# Patient Record
Sex: FEMALE | Race: WHITE | NOT HISPANIC OR LATINO | Employment: FULL TIME | URBAN - METROPOLITAN AREA
[De-identification: names, ages, dates, MRNs, and addresses within clinical notes are randomized per-mention and may not be internally consistent; named-entity substitution may affect disease eponyms.]

---

## 2018-04-14 ENCOUNTER — HOSPITAL ENCOUNTER (EMERGENCY)
Facility: HOSPITAL | Age: 53
Discharge: HOME | End: 2018-04-14
Attending: EMERGENCY MEDICINE | Admitting: EMERGENCY MEDICINE
Payer: COMMERCIAL

## 2018-04-14 VITALS
RESPIRATION RATE: 20 BRPM | BODY MASS INDEX: 22.76 KG/M2 | SYSTOLIC BLOOD PRESSURE: 202 MMHG | WEIGHT: 145 LBS | TEMPERATURE: 98.2 F | HEART RATE: 70 BPM | HEIGHT: 67 IN | DIASTOLIC BLOOD PRESSURE: 97 MMHG | OXYGEN SATURATION: 97 %

## 2018-04-14 DIAGNOSIS — H00.13 CHALAZION OF RIGHT EYE: Primary | ICD-10-CM

## 2018-04-14 PROCEDURE — 99283 EMERGENCY DEPT VISIT LOW MDM: CPT

## 2018-04-14 RX ORDER — SULFAMETHOXAZOLE AND TRIMETHOPRIM 800; 160 MG/1; MG/1
1 TABLET ORAL 2 TIMES DAILY
Qty: 20 TABLET | Refills: 0 | Status: SHIPPED | OUTPATIENT
Start: 2018-04-14 | End: 2018-04-24

## 2018-04-14 RX ORDER — SULFAMETHOXAZOLE AND TRIMETHOPRIM 800; 160 MG/1; MG/1
1 TABLET ORAL ONCE
Status: COMPLETED | OUTPATIENT
Start: 2018-04-14 | End: 2018-04-14

## 2018-04-14 RX ADMIN — SULFAMETHOXAZOLE AND TRIMETHOPRIM 1 TABLET: 800; 160 TABLET ORAL at 18:17

## 2018-04-14 NOTE — ED PROVIDER NOTES
History  Chief Complaint   Patient presents with    Blepharitis     Patient presents to Er stating she started with a stye in her right upper eye lid on thurday, now whole eyelid swollen and red, sent from [facility]  patient states she recently changed her mascara  Patient present to the ED with right upper eyelid pain and swelling for 2 days  She states she gets a recurrence of this every month before her period  Patient denies fevers/chills  She denies any visual disturbance  Patient has been applying warm compresses  She states this morning her right eye was swollen shut, but improved as the day went on  History provided by:  Patient  Eye Problem   Location:  Right eye  Quality:  Aching  Severity:  Mild  Onset quality:  Gradual  Duration:  2 days  Timing:  Constant  Progression:  Worsening  Chronicity:  Recurrent  Context: not contact lens problem, not direct trauma, not foreign body, not scratch and not UV exposure    Relieved by:  Nothing  Worsened by:  Nothing  Ineffective treatments: warm compresses  Associated symptoms: inflammation, redness and swelling    Associated symptoms: no blurred vision, no crusting, no decreased vision, no discharge, no facial rash, no itching, no nausea, no numbness, no photophobia, no tearing, no vomiting and no weakness        None       History reviewed  No pertinent past medical history  History reviewed  No pertinent surgical history  History reviewed  No pertinent family history  I have reviewed and agree with the history as documented  Social History   Substance Use Topics    Smoking status: Never Smoker    Smokeless tobacco: Never Used    Alcohol use Yes      Comment: socially        Review of Systems   Constitutional: Negative for chills and fever  HENT: Negative  Eyes: Positive for pain and redness  Negative for blurred vision, photophobia, discharge, itching and visual disturbance     Gastrointestinal: Negative for nausea and vomiting  Skin: Positive for color change  Negative for rash and wound  Neurological: Negative for dizziness, weakness and numbness  Psychiatric/Behavioral: Negative for confusion  All other systems reviewed and are negative  Physical Exam  ED Triage Vitals [04/14/18 1808]   Temperature Pulse Respirations Blood Pressure SpO2   98 2 °F (36 8 °C) 70 18 (!) 219/106 97 %      Temp Source Heart Rate Source Patient Position - Orthostatic VS BP Location FiO2 (%)   Temporal Monitor Sitting Right arm --      Pain Score       5           Orthostatic Vital Signs  Vitals:    04/14/18 1808 04/14/18 1820   BP: (!) 219/106 (!) 202/97   Pulse: 70    Patient Position - Orthostatic VS: Sitting Sitting       Physical Exam   Constitutional: She is oriented to person, place, and time  She appears well-developed and well-nourished  She is active and cooperative  She does not appear ill  No distress  HENT:   Head: Normocephalic and atraumatic  Right Ear: Hearing normal    Left Ear: Hearing normal    Nose: Nose normal    Eyes: Conjunctivae are normal  Pupils are equal, round, and reactive to light  Neck: Normal range of motion  Cardiovascular: Normal rate, regular rhythm and normal heart sounds  No murmur heard  Pulmonary/Chest: Effort normal and breath sounds normal  She has no wheezes  She has no rhonchi  She has no rales  Neurological: She is alert and oriented to person, place, and time  She has normal strength  No sensory deficit  Skin: Skin is warm and dry  There is erythema (to right upper eyelid  mild swelling under right eye, but no warmth or erythema  )  Nursing note and vitals reviewed        ED Medications  Medications   sulfamethoxazole-trimethoprim (BACTRIM DS) 800-160 mg per tablet 1 tablet (1 tablet Oral Given 4/14/18 1817)       Diagnostic Studies  Results Reviewed     None                 No orders to display              Procedures  Procedures       Phone Contacts  ED Phone Contact    ED Course  ED Course                                MDM  Number of Diagnoses or Management Options  Chalazion of right eye: established and worsening  Diagnosis management comments: Patient with chalazion which is worsening, will start on antibiotics and refer to eye doctor for incision and drainage  Patient instructed to return to ER if symptoms worsen  She was also instructed to discard her makeup and not use makeup until her infection clears  Patient Progress  Patient progress: stable    CritCare Time    Disposition  Final diagnoses:   Chalazion of right eye     Time reflects when diagnosis was documented in both MDM as applicable and the Disposition within this note     Time User Action Codes Description Comment    4/14/2018  6:11 PM Rehana Mai Add [H00 13] Chalazion of right eye       ED Disposition     ED Disposition Condition Comment    Discharge  Carol Buff discharge to home/self care  Condition at discharge: Stable        Follow-up Information     Follow up With Specialties Details Why Contact Info    Alanna Yanes MD Ophthalmology Call in 2 days For recheck [address]  [id number] [facility] [address]          Patient's Medications   Discharge Prescriptions    SULFAMETHOXAZOLE-TRIMETHOPRIM (BACTRIM DS) 800-160 MG PER TABLET    Take 1 tablet by mouth 2 (two) times a day for 10 days smx-tmp DS (BACTRIM) 800-160 mg tabs (1tab q12 D10)       Start Date: 4/14/2018 End Date: 4/24/2018       Order Dose: 1 tablet       Quantity: 20 tablet    Refills: 0     No discharge procedures on file      ED Provider  Electronically Signed by           Briana Porter PA-C  04/14/18 7712

## 2018-04-14 NOTE — DISCHARGE INSTRUCTIONS
Warm compresses to right eye for 20-30 minutes 4-5 times a day  Take antibiotic as directed  Call Dr Susan Quan on Monday for recheck  Chalazion   WHAT YOU NEED TO KNOW:   A chalazion is a lump on your eyelid  This lump develops because an oil gland in your eyelid is blocked  A chalazion may be small and then slowly grow bigger  DISCHARGE INSTRUCTIONS:   Medicines:   · Antibiotics: This medicine is given to fight or prevent an infection caused by bacteria  Always take your antibiotics exactly as ordered by your healthcare provider  Do not stop taking your medicine unless directed by your healthcare provider  Never save antibiotics or take leftover antibiotics that were given to you for another illness  · NSAIDs:  These medicines decrease swelling, pain, and fever  NSAIDs are available without a doctor's order  Ask which medicine is right for you and how much to take  Take as directed  NSAIDs can cause stomach bleeding or kidney problems if not taken correctly  · Take your medicine as directed  Contact your healthcare provider if you think your medicine is not helping or if you have side effects  Tell him of her if you are allergic to any medicine  Keep a list of the medicines, vitamins, and herbs you take  Include the amounts, and when and why you take them  Bring the list or the pill bottles to follow-up visits  Carry your medicine list with you in case of an emergency  Follow up with your healthcare provider as directed: You may need to return to have your eye checked  Write down your questions so you remember to ask them during your visits  Ways to help decrease eyelid swelling and pain:   · Apply a warm compress:  Wet a washcloth with warm water and place it on your eye  This will help decrease swelling and pain  Your healthcare provider will tell you how often to use a compress  · Massage your eyelid:  If you had a steroid shot, gently massage the area   This will help decrease pain and inflammation  Contact your healthcare provider if:   · The swelling and redness on your eyelid does not get better with treatment  · You see or feel a new lump on your eyelid  · You feel pressure behind your eyes  · You have questions or concerns about your condition or care  Return to the emergency department if:   · You have trouble moving your eyes  · Your eyelid or eye begins to bleed  · Your vision suddenly becomes worse  · Your eyelid suddenly becomes more swollen  © 2017 [facility] Information is for End User's use only and may not be sold, redistributed or otherwise used for commercial purposes  All illustrations and images included in CareNotes® are the copyrighted property of [employer]  or José Moreira  The above information is an  only  It is not intended as medical advice for individual conditions or treatments  Talk to your doctor, nurse or pharmacist before following any medical regimen to see if it is safe and effective for you

## 2021-04-13 DIAGNOSIS — Z23 ENCOUNTER FOR IMMUNIZATION: ICD-10-CM
